# Patient Record
Sex: FEMALE | Race: WHITE | NOT HISPANIC OR LATINO | Employment: OTHER | URBAN - METROPOLITAN AREA
[De-identification: names, ages, dates, MRNs, and addresses within clinical notes are randomized per-mention and may not be internally consistent; named-entity substitution may affect disease eponyms.]

---

## 2017-04-26 ENCOUNTER — NON-PROVIDER VISIT (OUTPATIENT)
Dept: PULMONOLOGY | Facility: HOSPICE | Age: 72
End: 2017-04-26
Payer: MEDICARE

## 2017-04-26 ENCOUNTER — OFFICE VISIT (OUTPATIENT)
Dept: PULMONOLOGY | Facility: HOSPICE | Age: 72
End: 2017-04-26
Payer: MEDICARE

## 2017-04-26 VITALS
SYSTOLIC BLOOD PRESSURE: 118 MMHG | WEIGHT: 223 LBS | HEIGHT: 67 IN | HEART RATE: 69 BPM | RESPIRATION RATE: 18 BRPM | BODY MASS INDEX: 35 KG/M2 | DIASTOLIC BLOOD PRESSURE: 80 MMHG | TEMPERATURE: 97.5 F

## 2017-04-26 VITALS — WEIGHT: 224 LBS | BODY MASS INDEX: 35.08 KG/M2

## 2017-04-26 DIAGNOSIS — J44.9 CHRONIC OBSTRUCTIVE PULMONARY DISEASE, UNSPECIFIED COPD, UNSPECIFIED CHRONIC BRONCHITIS TYPE: ICD-10-CM

## 2017-04-26 DIAGNOSIS — J44.9 CHRONIC OBSTRUCTIVE PULMONARY DISEASE, UNSPECIFIED COPD TYPE (HCC): ICD-10-CM

## 2017-04-26 DIAGNOSIS — R09.02 HYPOXEMIA: ICD-10-CM

## 2017-04-26 PROCEDURE — 3017F COLORECTAL CA SCREEN DOC REV: CPT | Mod: 8P | Performed by: NURSE PRACTITIONER

## 2017-04-26 PROCEDURE — 94726 PLETHYSMOGRAPHY LUNG VOLUMES: CPT | Performed by: INTERNAL MEDICINE

## 2017-04-26 PROCEDURE — G8419 CALC BMI OUT NRM PARAM NOF/U: HCPCS | Performed by: NURSE PRACTITIONER

## 2017-04-26 PROCEDURE — 94060 EVALUATION OF WHEEZING: CPT | Performed by: INTERNAL MEDICINE

## 2017-04-26 PROCEDURE — 4040F PNEUMOC VAC/ADMIN/RCVD: CPT | Mod: 8P | Performed by: NURSE PRACTITIONER

## 2017-04-26 PROCEDURE — 3014F SCREEN MAMMO DOC REV: CPT | Mod: 8P | Performed by: NURSE PRACTITIONER

## 2017-04-26 PROCEDURE — 99213 OFFICE O/P EST LOW 20 MIN: CPT | Performed by: NURSE PRACTITIONER

## 2017-04-26 PROCEDURE — 1101F PT FALLS ASSESS-DOCD LE1/YR: CPT | Mod: 8P | Performed by: NURSE PRACTITIONER

## 2017-04-26 PROCEDURE — 1036F TOBACCO NON-USER: CPT | Performed by: NURSE PRACTITIONER

## 2017-04-26 PROCEDURE — G8432 DEP SCR NOT DOC, RNG: HCPCS | Performed by: NURSE PRACTITIONER

## 2017-04-26 PROCEDURE — 94729 DIFFUSING CAPACITY: CPT | Performed by: INTERNAL MEDICINE

## 2017-04-26 ASSESSMENT — PULMONARY FUNCTION TESTS
FVC: 2.45
FEV1/FVC: 71
FVC_PREDICTED: 3.26
FEV1: 1.53
FVC_PERCENT_PREDICTED: 75
FEV1_PREDICTED: 2.46
FEV1/FVC_PERCENT_PREDICTED: 83
FEV1/FVC_PERCENT_PREDICTED: 75
FEV1/FVC: 62.45
FEV1: 1.44
FVC_PERCENT_PREDICTED: 61
FEV1_PERCENT_PREDICTED: 58
FEV1/FVC_PERCENT_PREDICTED: 95
FEV1_PERCENT_CHANGE: 21
FEV1/FVC_PERCENT_CHANGE: 24
FEV1_PERCENT_PREDICTED: 62
FEV1_PERCENT_CHANGE: 5
FVC: 2.02

## 2017-04-26 NOTE — PROGRESS NOTES
CC:  Here for f/u pulmonary issues as listed below    HPI:   Belinda presents today for follow up for COPD. PFTs from 4/2017 are stable, however she used her Symbicort this morning, which may have skewed the results. They indicate a Fev1 of 1.44L or 58% predicted with a positive bronchodilator response, Fev1/FVC ratio of 72, DLCO 96%. Patient is taking Symbicort 160-4.5, 2 puffs in the morning with mouth rinse. She stopped taking Spiriva and does not use her rescue inhaler. She has not required hospitalization.  She did have a respiratory infection appx 2 months ago with steroids and antibiotic. She is compliant using 2 L of oxygen at night. She has no complaints today. She feels her dyspnea is stable. She denies wheezing, cough, hemoptysis, chest pain, fevers or chills, morning headache, acid reflux. She is trying to be active.          Patient Active Problem List    Diagnosis Date Noted   • Hypoxemia 04/26/2016   • COLD (chronic obstructive lung disease) (CMS-HCC) 04/26/2016       Past Medical History   Diagnosis Date   • COPD (chronic obstructive pulmonary disease) (CMS-HCC)    • Diabetes (CMS-HCC)    • Pleurisy    • Pneumonia    • Bronchitis        History reviewed. No pertinent past surgical history.    Family History   Problem Relation Age of Onset   • Heart Failure Mother    • Stroke Father        Social History   Substance Use Topics   • Smoking status: Former Smoker -- 1.00 packs/day for 55 years     Types: Cigarettes     Quit date: 01/01/2013   • Smokeless tobacco: None   • Alcohol Use: No       Current Outpatient Prescriptions   Medication Sig Dispense Refill   • budesonide-formoterol (SYMBICORT) 160-4.5 MCG/ACT Aerosol Inhale 2 Puffs by mouth 2 Times a Day. 1 Inhaler 11   • aspirin (ASA) 325 MG Tab Take 325 mg by mouth every 6 hours as needed.     • glipiZIDE SR (GLUCOTROL) 2.5 MG TABLET SR 24 HR Take 2.5 mg by mouth every day.       No current facility-administered medications for this visit.     "      Allergies: Review of patient's allergies indicates no known allergies.          ROS   Gen: Denies fever, chills, unintentional weight loss, fatigue, night sweats  E/N/T: Denies nasal congestion, ear pain  Resp:Denies Dyspnea, wheezing, cough, sputum production, hemoptysis  CV: Denies chest pain, chest tightness, palpitations  Sleep:Denies morning headache  Neuro: Denies frequent headaches, weakness, dizziness  GI: Denies acid reflux, N/V  See HPI.  All other systems reviewed and negative          Vital signs for this encounter:  Filed Vitals:    04/26/17 1535   Height: 1.702 m (5' 7.01\")   Weight: 101.152 kg (223 lb)   Weight % change since last entry.: 0 %   BP: 118/80   Pulse: 69   BMI (Calculated): 34.92   Resp: 18   Temp: 36.4 °C (97.5 °F)   TempSrc: Temporal   O2 sat % room air: 93 %                 Physical Exam:   Appearance: well developed, well nourished, no acute distress.   Eyes: PERRL, EOM intact, sclere white, conjunctiva moist.  Ears: no lesions or deformities.  Hearing: grossly intact.  Nose: no lesions or deformities.  Dentition: good dentition.   Oropharynx: tongue normal, posterior pharynx without erythema or exudate.  Neck: supple, trachea midline, no masses.  Respiratory effort: no intercostal retractions or use of accessory muscles.  Lung auscultation: Bilateral diminished   Heart auscultation: no murmur, rub, or gallop   Extremities: no cyanosis or edema.  Abdomen: soft, non-tender, no masses.  Gait and station: without difficulty   Digits and Nails: no clubbing, cyanosis, petechiae, or nodes.  Cranial nerves: grossly normal.  Motor: no focal deficits observed.   Skin: no rashes, lesions, or ulcers noted.  Orientation: oriented to time, place, and person.  Mood and affect: mood and affect appropriate, normal interaction with examiner.      Assessment   1. Hypoxemia     2. Chronic obstructive pulmonary disease, unspecified COPD type (CMS-HCC)         PLAN:   Patient Instructions   1) " Continue Symbicort 160/4.5   2) Continue nighttime oxygen at 2L    3) Vaccines: will update with primary provider  4) Return in about 1 year (around 4/26/2018) for review of symptoms, if not sooner, follow up with ZOYA Rodriguez.

## 2017-04-26 NOTE — MR AVS SNAPSHOT
"        Belinda Gaitan   2017 4:00 PM   Office Visit   MRN: [id number]    Department:  [facility]   Dept Phone:  [phone]    Description:  Female : 1945   Provider:  CATRACHITO Vyas           Reason for Visit     Results PFT results.      Allergies as of 2017     No Known Allergies      Vital Signs     Blood Pressure Pulse Temperature Respirations Height Weight    118/80 mmHg 69 36.4 °C (97.5 °F) (Temporal) 18 1.702 m (5' 7.01\") 101.152 kg (223 lb)    Body Mass Index Smoking Status                34.92 kg/m2 Former Smoker          Basic Information     Date Of Birth Sex Race Ethnicity Preferred Language    1945 Female White Non- English      Problem List              ICD-10-CM Priority Class Noted - Resolved    Hypoxemia R09.02   2016 - Present    COLD (chronic obstructive lung disease) (CMS-[facility]) J44.9   2016 - Present      Health Maintenance        Date Due Completion Dates    IMM DTaP/Tdap/Td Vaccine (1 - Tdap) 3/31/1963 ---    PAP SMEAR 3/31/1965 ---    MAMMOGRAM 3/31/1984 ---    COLONOSCOPY 3/31/1994 ---    IMM ZOSTER VACCINE 3/31/2004 ---    BONE DENSITY 3/31/2009 ---    IMM PNEUMOCOCCAL 65+ (ADULT) LOW/MEDIUM RISK SERIES (1 of 2 - PCV13) 3/31/2009 ---            Current Immunizations     No immunizations on file.      Below and/or attached are the medications your provider expects you to take. Review all of your home medications and newly ordered medications with your provider and/or pharmacist. Follow medication instructions as directed by your provider and/or pharmacist. Please keep your medication list with you and share with your provider. Update the information when medications are discontinued, doses are changed, or new medications (including over-the-counter products) are added; and carry medication information at all times in the event of emergency situations     Allergies:  No Known Allergies          Medications  Valid as of: " 2017 -  4:05 PM    Generic Name Brand Name Tablet Size Instructions for use    Aspirin (Tab)  MG Take 325 mg by mouth every 6 hours as needed.        Budesonide-Formoterol Fumarate (Aerosol) SYMBICORT 160-4.5 MCG/ACT Inhale 2 Puffs by mouth 2 Times a Day.        GlipiZIDE (TABLET SR 24 HR) GLUCOTROL 2.5 MG Take 2.5 mg by mouth every day.        .                 Medicines prescribed today were sent to:     [address] [address]    Phone: [phone] Fax: [phone]    Open 24 Hours?: No      Medication refill instructions:       If your prescription bottle indicates you have medication refills left, it is not necessary to call your provider’s office. Please contact your pharmacy and they will refill your medication.    If your prescription bottle indicates you do not have any refills left, you may request refills at any time through one of the following ways: The online [employer] system (except Urgent Care), by calling your provider’s office, or by asking your pharmacy to contact your provider’s office with a refill request. Medication refills are processed only during regular business hours and may not be available until the next business day. Your provider may request additional information or to have a follow-up visit with you prior to refilling your medication.   *Please Note: Medication refills are assigned a new Rx number when refilled electronically. Your pharmacy may indicate that no refills were authorized even though a new prescription for the same medication is available at the pharmacy. Please request the medicine by name with the pharmacy before contacting your provider for a refill.        Instructions    1) Continue Symbicort 160/4.5   2) Continue nighttime oxygen at 2L    3) Vaccines: will update with primary provider  4) Return in about 1 year (around 4/26/2018) for review of symptoms, if not sooner, follow up with Aishwarya  ZOYA Moreno.              MyChart Status: Patient Declined

## 2017-04-26 NOTE — MR AVS SNAPSHOT
Belinda Gaitan   2017 3:00 PM   Non-Provider Visit   MRN: [id number]    Department:  [facility]   Dept Phone:  [phone]    Description:  Female : 1945   Provider:  Nahomi Slaughter M.D.           Reason for Visit     COPD           Allergies as of 2017     No Known Allergies      You were diagnosed with     Chronic obstructive pulmonary disease, unspecified COPD, unspecified chronic bronchitis type   [[id number]]         Vital Signs     Weight Smoking Status                101.606 kg (224 lb) Former Smoker          Basic Information     Date Of Birth Sex Race Ethnicity Preferred Language    1945 Female White Non- English      Your appointments     2017  4:00 PM   Established Patient Pul with CATRACHITO Vyas   [facility] (--)    [address]  [address]  [place] [id number] [phone]              Problem List              ICD-10-CM Priority Class Noted - Resolved    Hypoxemia R09.02   2016 - Present    COLD (chronic obstructive lung disease) (CMS-[facility]) J44.9   2016 - Present      Health Maintenance        Date Due Completion Dates    IMM DTaP/Tdap/Td Vaccine (1 - Tdap) 3/31/1963 ---    PAP SMEAR 3/31/1965 ---    MAMMOGRAM 3/31/1984 ---    COLONOSCOPY 3/31/1994 ---    IMM ZOSTER VACCINE 3/31/2004 ---    BONE DENSITY 3/31/2009 ---    IMM PNEUMOCOCCAL 65+ (ADULT) LOW/MEDIUM RISK SERIES (1 of 2 - PCV13) 3/31/2009 ---            Current Immunizations     No immunizations on file.      Below and/or attached are the medications your provider expects you to take. Review all of your home medications and newly ordered medications with your provider and/or pharmacist. Follow medication instructions as directed by your provider and/or pharmacist. Please keep your medication list with you and share with your provider. Update the information when medications are discontinued, doses are changed, or new medications (including  over-the-counter products) are added; and carry medication information at all times in the event of emergency situations     Allergies:  No Known Allergies          Medications  Valid as of: April 26, 2017 -  3:47 PM    Generic Name Brand Name Tablet Size Instructions for use    Aspirin (Tab)  MG Take 325 mg by mouth every 6 hours as needed.        Budesonide-Formoterol Fumarate (Aerosol) SYMBICORT 160-4.5 MCG/ACT Inhale 2 Puffs by mouth 2 Times a Day.        GlipiZIDE (TABLET SR 24 HR) GLUCOTROL 2.5 MG Take 2.5 mg by mouth every day.        .                 Medicines prescribed today were sent to:     [facility] [address] [place] [address], [place] [address] [place] [address]    Phone: [phone] Fax: [phone]    Open 24 Hours?: No      Medication refill instructions:       If your prescription bottle indicates you have medication refills left, it is not necessary to call your provider’s office. Please contact your pharmacy and they will refill your medication.    If your prescription bottle indicates you do not have any refills left, you may request refills at any time through one of the following ways: The online [employer] system (except Urgent Care), by calling your provider’s office, or by asking your pharmacy to contact your provider’s office with a refill request. Medication refills are processed only during regular business hours and may not be available until the next business day. Your provider may request additional information or to have a follow-up visit with you prior to refilling your medication.   *Please Note: Medication refills are assigned a new Rx number when refilled electronically. Your pharmacy may indicate that no refills were authorized even though a new prescription for the same medication is available at the pharmacy. Please request the medicine by name with the pharmacy before contacting your provider for a refill.           MyChart Status: Patient Declined

## 2017-04-26 NOTE — PROCEDURES
Technician: XAVIER Thomas    Technician Comment:  Good patient effort & cooperation.  The results of this test meet the ATS/ERS standards for acceptability & reproducibility.  Test was performed on the [employer] Body Plethysmograph-Elite DX system.  Predicted values were [facility]-3 for spirometry, [facility] for DLCO, ITS for Lung Volumes.  The DLCO was uncorrected for Hgb.  A bronchodilator of Ventolin HFA -2puffs via spacer administered.    Interpretation:    SPIROMETRY:  1. FVC was 2/1/02 L, 61 % of predicted  2. FEV1 was 1.44 L, 5 feet % of predicted   3. FEV1/FVC ratio was 62 %  4. There was  significant response to bronchodilators with FVC improving by more than 21%.   5. Flow volume loop     LUNG VOLUMES:  1. TLC was 93 % of predicted   2. RV was  123 % of predicted     DIFFUSION CAPACITY:  1.Defusion capacity was  96 % of predicted       IMPRESSION:  The patient has moderate obstructive ventilatory defect evident with low FEV1 to 58% of predicted. The patient also has moderate air trapping with residual volume of 123%.  These findings are consistent with the patient diagnosis of COPD. Clinical correlation is required.

## 2017-04-26 NOTE — PATIENT INSTRUCTIONS
1) Continue Symbicort 160/4.5   2) Continue nighttime oxygen at 2L    3) Vaccines: will update with primary provider  4) Return in about 1 year (around 4/26/2018) for review of symptoms, if not sooner, follow up with ZOYA Rodriguez.

## 2023-01-24 PROBLEM — M25.572 ACUTE LEFT ANKLE PAIN: Status: ACTIVE | Noted: 2023-01-24
